# Patient Record
Sex: FEMALE | Race: WHITE | URBAN - METROPOLITAN AREA
[De-identification: names, ages, dates, MRNs, and addresses within clinical notes are randomized per-mention and may not be internally consistent; named-entity substitution may affect disease eponyms.]

---

## 2023-07-24 ENCOUNTER — NURSING HOME VISIT (OUTPATIENT)
Dept: POST ACUTE CARE | Facility: EXTERNAL LOCATION | Age: 88
End: 2023-07-24
Payer: MEDICARE

## 2023-07-24 DIAGNOSIS — F03.C0 SEVERE DEMENTIA, UNSPECIFIED DEMENTIA TYPE, UNSPECIFIED WHETHER BEHAVIORAL, PSYCHOTIC, OR MOOD DISTURBANCE OR ANXIETY (MULTI): Primary | ICD-10-CM

## 2023-07-24 DIAGNOSIS — R29.6 FREQUENT FALLS: ICD-10-CM

## 2023-07-24 DIAGNOSIS — S81.811D NONINFECTED SKIN TEAR OF LEG, RIGHT, SUBSEQUENT ENCOUNTER: ICD-10-CM

## 2023-07-24 DIAGNOSIS — R53.1 WEAKNESS: ICD-10-CM

## 2023-07-24 PROBLEM — F03.90 DEMENTIA (MULTI): Status: ACTIVE | Noted: 2023-07-24

## 2023-07-24 PROCEDURE — 99349 HOME/RES VST EST MOD MDM 40: CPT

## 2023-07-24 NOTE — LETTER
Patient: Victor Hugo Whipple  : 3/12/1925    Encounter Date: 2023    PROGRESS NOTE    Subjective  Chief complaint: Victor Hugo Whipple is a 89 y.o. female who is an assisted living facility patient being seen and evaluated for  general medical care and monthly eval    HPI:  Patient seen and evaluated for general medical care and monthly follow-up.  Patient resides in an assisted for assistance with ADLs and medical care.  History includes dementia, weakness, frequent falls. Patient at baseline mentation, confused.  Denies F/C/N/V/D, SOB, cough.  No concerns per nursing      Objective  Vital signs:  115/70-97.1-74-20-98%    Physical Exam  HENT:      Head: Normocephalic.      Mouth/Throat:      Mouth: Mucous membranes are moist.   Eyes:      Extraocular Movements: Extraocular movements intact.   Cardiovascular:      Rate and Rhythm: Normal rate and regular rhythm.      Pulses: Normal pulses.      Heart sounds: Normal heart sounds.   Pulmonary:      Effort: Pulmonary effort is normal.      Breath sounds: Normal breath sounds.   Abdominal:      General: Abdomen is flat. Bowel sounds are normal.      Palpations: Abdomen is soft.   Musculoskeletal:         General: Normal range of motion.      Cervical back: Normal range of motion.      Comments:  generalized weakness    Skin:     General: Skin is warm and dry.      Capillary Refill: Capillary refill takes less than 2 seconds.      Comments:  multiple areas of ecchymosis   RLE skin tear   Neurological:      Mental Status: She is alert. Mental status is at baseline. She is disoriented.   Psychiatric:         Behavior: Behavior normal.         Assessment/Plan  Problem List Items Addressed This Visit       Dementia (CMS/HCC) - Primary      Stable   Monitor   continue current therapy   maintain safe environment   no agitation, aggressive/combative behavior per nursing         Weakness      reinforced to patient to call for assistance when getting up         Frequent falls      ensure  safety   reinforced patient need to call for assistance   medical device on necklace         Noninfected skin tear of leg, right, subsequent encounter      skin tear RLE   wash with soap and water, pat dry, bacitracin, DSD   monitor          Medications, treatments, and labs reviewed  Continue medications and treatments as listed in EMR    TASHIA Burger      Electronically Signed By: TASHIA Burger   7/24/23  9:58 PM

## 2023-07-25 NOTE — ASSESSMENT & PLAN NOTE
Stable   Monitor   continue current therapy   maintain safe environment   no agitation, aggressive/combative behavior per nursing

## 2023-07-25 NOTE — PROGRESS NOTES
PROGRESS NOTE    Subjective   Chief complaint: Victor Hugo Whipple is a 89 y.o. female who is an assisted living facility patient being seen and evaluated for  general medical care and monthly eval    HPI:  Patient seen and evaluated for general medical care and monthly follow-up.  Patient resides in an correction for assistance with ADLs and medical care.  History includes dementia, weakness, frequent falls. Patient at baseline mentation, confused.  Denies F/C/N/V/D, SOB, cough.  No concerns per nursing      Objective   Vital signs:  115/70-97.1-74-20-98%    Physical Exam  HENT:      Head: Normocephalic.      Mouth/Throat:      Mouth: Mucous membranes are moist.   Eyes:      Extraocular Movements: Extraocular movements intact.   Cardiovascular:      Rate and Rhythm: Normal rate and regular rhythm.      Pulses: Normal pulses.      Heart sounds: Normal heart sounds.   Pulmonary:      Effort: Pulmonary effort is normal.      Breath sounds: Normal breath sounds.   Abdominal:      General: Abdomen is flat. Bowel sounds are normal.      Palpations: Abdomen is soft.   Musculoskeletal:         General: Normal range of motion.      Cervical back: Normal range of motion.      Comments:  generalized weakness    Skin:     General: Skin is warm and dry.      Capillary Refill: Capillary refill takes less than 2 seconds.      Comments:  multiple areas of ecchymosis   RLE skin tear   Neurological:      Mental Status: She is alert. Mental status is at baseline. She is disoriented.   Psychiatric:         Behavior: Behavior normal.         Assessment/Plan   Problem List Items Addressed This Visit       Dementia (CMS/HCC) - Primary      Stable   Monitor   continue current therapy   maintain safe environment   no agitation, aggressive/combative behavior per nursing         Weakness      reinforced to patient to call for assistance when getting up         Frequent falls      ensure safety   reinforced patient need to call for assistance   medical  device on necklace         Noninfected skin tear of leg, right, subsequent encounter      skin tear RLE   wash with soap and water, pat dry, bacitracin, DSD   monitor          Medications, treatments, and labs reviewed  Continue medications and treatments as listed in EMR    Cristina De Anda, APRN-CNP

## 2023-09-04 ENCOUNTER — NURSING HOME VISIT (OUTPATIENT)
Dept: POST ACUTE CARE | Facility: EXTERNAL LOCATION | Age: 88
End: 2023-09-04
Payer: MEDICARE

## 2023-09-04 DIAGNOSIS — R29.6 FREQUENT FALLS: ICD-10-CM

## 2023-09-04 DIAGNOSIS — F03.C0 SEVERE DEMENTIA, UNSPECIFIED DEMENTIA TYPE, UNSPECIFIED WHETHER BEHAVIORAL, PSYCHOTIC, OR MOOD DISTURBANCE OR ANXIETY (MULTI): Primary | ICD-10-CM

## 2023-09-04 DIAGNOSIS — R53.1 WEAKNESS: ICD-10-CM

## 2023-09-04 PROCEDURE — 99349 HOME/RES VST EST MOD MDM 40: CPT

## 2023-09-15 NOTE — PROGRESS NOTES
"PROGRESS NOTE    Subjective   Chief complaint: Victor Hugo Whipple is a 89 y.o. female who is an assisted living facility patient being seen and evaluated for general weakness and monthly follow-up    HPI:  Patient seen and evaluated for general medical care and monthly follow-up.  Patient at baseline mentation, appropriate.  Patient complains of being \"tired\".  Discussed asking for assistance in caring for her spouse instead of taking on  full responsibility.  Patient denies chest pain, SOB, F/C.  HTN stable-.  No headache, visual changes, dizziness.  Offers no complaints.  No concerns per nursing      Objective   Vital signs:  124/82-97.5-79-18-98%    Physical Exam  Constitutional:       Appearance: Normal appearance.   HENT:      Head: Normocephalic.      Mouth/Throat:      Mouth: Mucous membranes are moist.   Eyes:      Extraocular Movements: Extraocular movements intact.   Cardiovascular:      Rate and Rhythm: Normal rate and regular rhythm.      Pulses: Normal pulses.      Heart sounds: Normal heart sounds.   Pulmonary:      Effort: Pulmonary effort is normal.      Breath sounds: Normal breath sounds.   Abdominal:      General: Bowel sounds are normal.      Palpations: Abdomen is soft.   Musculoskeletal:         General: Normal range of motion.      Cervical back: Normal range of motion and neck supple.   Skin:     General: Skin is warm and dry.      Capillary Refill: Capillary refill takes less than 2 seconds.   Neurological:      Mental Status: She is alert. Mental status is at baseline.   Psychiatric:         Mood and Affect: Mood normal.         Behavior: Behavior normal.         Assessment/Plan   Problem List Items Addressed This Visit       Dementia (CMS/HCC) - Primary      Stable   Monitor   continue current therapy   maintain safe environment   no agitation, aggressive/combative behavior per nursing         Weakness      reinforced to patient to call for assistance when getting up         Frequent falls      " ensure safety   reinforced patient need to call for assistance   medical device on necklace          Medications, treatments, and labs reviewed  Continue medications and treatments as listed in EMR    Cristina De Anda, APRN-CNP